# Patient Record
Sex: MALE | Race: WHITE | NOT HISPANIC OR LATINO | Employment: FULL TIME | URBAN - METROPOLITAN AREA
[De-identification: names, ages, dates, MRNs, and addresses within clinical notes are randomized per-mention and may not be internally consistent; named-entity substitution may affect disease eponyms.]

---

## 2022-07-13 ENCOUNTER — APPOINTMENT (OUTPATIENT)
Dept: ULTRASOUND IMAGING | Facility: CLINIC | Age: 42
End: 2022-07-13
Attending: FAMILY MEDICINE
Payer: COMMERCIAL

## 2022-07-13 ENCOUNTER — HOSPITAL ENCOUNTER (EMERGENCY)
Facility: CLINIC | Age: 42
Discharge: HOME | End: 2022-07-13
Attending: FAMILY MEDICINE | Admitting: FAMILY MEDICINE
Payer: COMMERCIAL

## 2022-07-13 VITALS
DIASTOLIC BLOOD PRESSURE: 97 MMHG | SYSTOLIC BLOOD PRESSURE: 197 MMHG | OXYGEN SATURATION: 95 % | HEIGHT: 74 IN | WEIGHT: 315 LBS | HEART RATE: 100 BPM | BODY MASS INDEX: 40.43 KG/M2 | RESPIRATION RATE: 18 BRPM | TEMPERATURE: 96 F

## 2022-07-13 DIAGNOSIS — L03.115 CELLULITIS OF RIGHT LOWER EXTREMITY: ICD-10-CM

## 2022-07-13 PROBLEM — G43.909 MIGRAINE: Status: ACTIVE | Noted: 2022-07-13

## 2022-07-13 PROBLEM — G47.33 OSA (OBSTRUCTIVE SLEEP APNEA): Status: ACTIVE | Noted: 2022-07-13

## 2022-07-13 PROCEDURE — 99284 EMERGENCY DEPT VISIT MOD MDM: CPT | Performed by: FAMILY MEDICINE

## 2022-07-13 PROCEDURE — 99284 EMERGENCY DEPT VISIT MOD MDM: CPT | Mod: 25 | Performed by: FAMILY MEDICINE

## 2022-07-13 PROCEDURE — 93971 EXTREMITY STUDY: CPT | Mod: RT

## 2022-07-13 NOTE — ED TRIAGE NOTES
Pt here with R lower leg pain and swelling. Clinic advised him to come to rule out blood clot.      Triage Assessment     Row Name 07/13/22 1101       Triage Assessment (Adult)    Airway WDL WDL       Respiratory WDL    Respiratory WDL WDL       Skin Circulation/Temperature WDL    Skin Circulation/Temperature WDL WDL       Cardiac WDL    Cardiac WDL WDL       Peripheral/Neurovascular WDL    Peripheral Neurovascular WDL WDL       Cognitive/Neuro/Behavioral WDL    Cognitive/Neuro/Behavioral WDL WDL

## 2022-07-13 NOTE — DISCHARGE INSTRUCTIONS
Augmentin 875 mg p.o. twice daily x14 days.  Please keep a close eye on the area on your right calf.  You should keep some type of Aquaphor/Vaseline/bacitracin on it at all times.  Gradual improvement would be expected after about day or 2 or 3 of antibiotic start.  If worse or changes please return to the emergency department.

## 2022-07-13 NOTE — ED PROVIDER NOTES
History     Chief Complaint   Patient presents with     Leg Swelling     HPI  Marc Mims is a 42 year old male, past medical history is significant for migraine, obstructive sleep apnea, impaired fasting glucose, vitamin D deficiency, hypertension, morbid obesity, the patient presents to the emergency department with concerns of right leg pain of several weeks duration, originally beginning with twitching and spasming and pain in the right calf up into the right posterior thigh area and the right groin.  He can recall no trauma or injury recently but did notice or at least his wife noticed some blisters and some skin breakdown on the back of his calf about 2 or 3 days ago.  When they called the clinic they advised him to come to the emergency department for evaluation.  He denies any fever chills or sweats.  No nausea or vomiting.  No shortness of breath chest pain palpitations or lightheadedness.  The patient denies any exposure to potential contact allergens such as poison ivy/oak/sumac with respect to the development of the rash and skin breakdown on the right leg.    Allergies:  No Known Allergies    Problem List:    Patient Active Problem List    Diagnosis Date Noted     Migraine 07/13/2022     Priority: Medium     CESIA (obstructive sleep apnea) 07/13/2022     Priority: Medium     Impaired fasting glucose 11/14/2013     Priority: Medium     Vitamin D deficiency 10/06/2012     Priority: Medium     Hypertension 10/25/2011     Priority: Medium        Past Medical History:    No past medical history on file.    Past Surgical History:    No past surgical history on file.    Family History:    No family history on file.    Social History:  Marital Status:  Single [1]        Medications:    amoxicillin-clavulanate (AUGMENTIN) 875-125 MG tablet          Review of Systems   All other systems reviewed and are negative.      Physical Exam   BP: (!) 197/97  Pulse: 100  Temp: (!) 96  F (35.6  C)  Resp: 18  Height: 188  "cm (6' 2\")  Weight: (!) 224.5 kg (495 lb)  SpO2: 95 %      Physical Exam  Vitals and nursing note reviewed.   Constitutional:       General: He is not in acute distress.     Appearance: Normal appearance. He is obese. He is not ill-appearing.   Musculoskeletal:         General: Swelling and tenderness present.      Comments: Right lower extremity appears symmetric to the left lower extremity, the back of the right calf has clustered erythematous skin breakdown not blisters not vesicles.   Neurological:      Mental Status: He is alert.         ED Course                 Procedures              Critical Care time:  none               Results for orders placed or performed during the hospital encounter of 07/13/22 (from the past 24 hour(s))   US Lower Extremity Venous Duplex Right    Narrative    US LOWER EXTREMITY VENOUS DUPLEX RIGHT 7/13/2022 1:26 PM    CLINICAL HISTORY: Leg pain and swelling rule out DVT  TECHNIQUE: Venous Duplex ultrasound of the right lower extremity with  and without compression, augmentation and duplex. Color flow and  spectral Doppler with waveform analysis performed.    COMPARISON: None.    FINDINGS: Exam includes the common femoral, femoral, popliteal, and  contralateral common femoral veins as well as segmentally visualized  deep calf veins and greater saphenous vein.     RIGHT: No deep vein thrombosis. No superficial thrombophlebitis. No  popliteal cyst.      Impression    IMPRESSION:  1.  No deep venous thrombosis in the right lower extremity.    JACIEL LAGUNAS MD         SYSTEM ID:  [id number]     1:45 PM  Reviewed ultrasound in the room with the patient.  Answered questions.  Medications - No data to display    Assessments & Plan (with Medical Decision Making)   42-year-old male past medical history reviewed as above who presents with concerns of leg swelling and associated symptoms as well as skin changes as described in the HPI and documented with respect to abnormalities on exam.  " Differential diagnostic considerations were reviewed with the patient before proceeding with a evaluation which includes ultrasound of the right lower extremity which demonstrate no DVT in the right lower extremity.  I think that most of his symptoms are likely secondary to cellulitis involving his right calf.  I placed him on Augmentin 875 mg p.o.twice daily for the next 10 days.  Gradual improvement beginning around day 2-3 as expected and if he is not improving or any worsening he will return to the emergency department.      Disclaimer: This note consists of symbols derived from keyboarding, dictation and/or voice recognition software. As a result, there may be errors in the script that have gone undetected. Please consider this when interpreting information found in this chart.      I have reviewed the nursing notes.    I have reviewed the findings, diagnosis, plan and need for follow up with the patient.       New Prescriptions    AMOXICILLIN-CLAVULANATE (AUGMENTIN) 875-125 MG TABLET    Take 1 tablet by mouth 2 times daily for 14 days       Final diagnoses:   Cellulitis of right lower extremity       7/13/2022   [facility] EMERGENCY DEPT     Alin Evans MD  07/13/22 [id number]